# Patient Record
Sex: MALE | Race: BLACK OR AFRICAN AMERICAN | NOT HISPANIC OR LATINO | Employment: OTHER | URBAN - METROPOLITAN AREA
[De-identification: names, ages, dates, MRNs, and addresses within clinical notes are randomized per-mention and may not be internally consistent; named-entity substitution may affect disease eponyms.]

---

## 2023-07-28 DIAGNOSIS — E78.2 HYPERLIPIDEMIA, MIXED: ICD-10-CM

## 2023-07-28 DIAGNOSIS — I10 HYPERTENSION, UNSPECIFIED TYPE: Primary | ICD-10-CM

## 2023-07-28 RX ORDER — HYDROCHLOROTHIAZIDE 25 MG/1
25 TABLET ORAL DAILY
COMMUNITY
End: 2023-07-28 | Stop reason: SDUPTHER

## 2023-07-28 RX ORDER — ROSUVASTATIN CALCIUM 10 MG/1
10 TABLET, COATED ORAL DAILY
COMMUNITY
End: 2023-07-28 | Stop reason: SDUPTHER

## 2023-07-31 RX ORDER — HYDROCHLOROTHIAZIDE 25 MG/1
25 TABLET ORAL DAILY
Qty: 30 TABLET | Refills: 0 | Status: SHIPPED | OUTPATIENT
Start: 2023-07-31

## 2023-07-31 RX ORDER — ROSUVASTATIN CALCIUM 10 MG/1
10 TABLET, COATED ORAL DAILY
Qty: 30 TABLET | Refills: 0 | Status: SHIPPED | OUTPATIENT
Start: 2023-07-31

## 2023-08-28 ENCOUNTER — APPOINTMENT (OUTPATIENT)
Dept: PRIMARY CARE | Facility: CLINIC | Age: 53
End: 2023-08-28

## 2025-03-06 ENCOUNTER — APPOINTMENT (OUTPATIENT)
Dept: PRIMARY CARE | Facility: CLINIC | Age: 55
End: 2025-03-06
Payer: COMMERCIAL

## 2025-03-27 ENCOUNTER — APPOINTMENT (OUTPATIENT)
Dept: PRIMARY CARE | Facility: CLINIC | Age: 55
End: 2025-03-27
Payer: COMMERCIAL

## 2025-03-27 VITALS
TEMPERATURE: 98 F | DIASTOLIC BLOOD PRESSURE: 75 MMHG | SYSTOLIC BLOOD PRESSURE: 136 MMHG | HEART RATE: 94 BPM | OXYGEN SATURATION: 95 % | BODY MASS INDEX: 30.1 KG/M2 | WEIGHT: 215 LBS | HEIGHT: 71 IN

## 2025-03-27 DIAGNOSIS — Z11.4 SCREENING FOR HIV (HUMAN IMMUNODEFICIENCY VIRUS): ICD-10-CM

## 2025-03-27 DIAGNOSIS — Z00.00 HEALTHCARE MAINTENANCE: Primary | ICD-10-CM

## 2025-03-27 DIAGNOSIS — A60.01 HERPES SIMPLEX INFECTION OF PENIS: ICD-10-CM

## 2025-03-27 DIAGNOSIS — E78.2 HYPERLIPIDEMIA, MIXED: ICD-10-CM

## 2025-03-27 DIAGNOSIS — E55.9 HYPOVITAMINOSIS D: ICD-10-CM

## 2025-03-27 DIAGNOSIS — L20.84 INTRINSIC ECZEMA: ICD-10-CM

## 2025-03-27 DIAGNOSIS — Z12.11 COLON CANCER SCREENING: ICD-10-CM

## 2025-03-27 DIAGNOSIS — Z11.59 NEED FOR HEPATITIS C SCREENING TEST: ICD-10-CM

## 2025-03-27 DIAGNOSIS — I10 HYPERTENSION, UNSPECIFIED TYPE: ICD-10-CM

## 2025-03-27 DIAGNOSIS — Z83.3 FAMILY HISTORY OF DIABETES MELLITUS: ICD-10-CM

## 2025-03-27 DIAGNOSIS — Z12.5 PROSTATE CANCER SCREENING: ICD-10-CM

## 2025-03-27 RX ORDER — TRIAMCINOLONE ACETONIDE 1 MG/G
OINTMENT TOPICAL 2 TIMES DAILY
Qty: 453 G | Refills: 3 | Status: SHIPPED | OUTPATIENT
Start: 2025-03-27

## 2025-03-27 RX ORDER — ACETAMINOPHEN 500 MG
2000 TABLET ORAL
COMMUNITY
Start: 2024-10-08

## 2025-03-27 RX ORDER — ACETAMINOPHEN 500 MG
2000 TABLET ORAL
Qty: 90 CAPSULE | Refills: 3 | Status: CANCELLED | OUTPATIENT
Start: 2025-03-27

## 2025-03-27 RX ORDER — ATORVASTATIN CALCIUM 10 MG/1
TABLET, FILM COATED ORAL EVERY 24 HOURS
COMMUNITY

## 2025-03-27 RX ORDER — VALACYCLOVIR HYDROCHLORIDE 500 MG/1
500 TABLET, FILM COATED ORAL
COMMUNITY
Start: 2024-10-08 | End: 2025-03-27 | Stop reason: SDUPTHER

## 2025-03-27 RX ORDER — ATORVASTATIN CALCIUM 10 MG/1
10 TABLET, FILM COATED ORAL DAILY
Qty: 90 TABLET | Refills: 3 | Status: CANCELLED | OUTPATIENT
Start: 2025-03-27

## 2025-03-27 RX ORDER — ASPIRIN 81 MG/1
81 TABLET ORAL DAILY
Qty: 90 TABLET | Refills: 3 | Status: CANCELLED | OUTPATIENT
Start: 2025-03-27

## 2025-03-27 RX ORDER — TRIAMCINOLONE ACETONIDE 1 MG/G
OINTMENT TOPICAL
COMMUNITY
Start: 2021-09-15 | End: 2025-03-27 | Stop reason: SDUPTHER

## 2025-03-27 RX ORDER — ROSUVASTATIN CALCIUM 10 MG/1
10 TABLET, COATED ORAL DAILY
Qty: 90 TABLET | Refills: 3 | Status: SHIPPED | OUTPATIENT
Start: 2025-03-27

## 2025-03-27 RX ORDER — ASPIRIN 81 MG/1
1 TABLET ORAL DAILY
COMMUNITY
Start: 2021-03-07

## 2025-03-27 RX ORDER — HYDROCHLOROTHIAZIDE 25 MG/1
25 TABLET ORAL DAILY
Qty: 90 TABLET | Refills: 3 | Status: SHIPPED | OUTPATIENT
Start: 2025-03-27

## 2025-03-27 RX ORDER — VALACYCLOVIR HYDROCHLORIDE 500 MG/1
500 TABLET, FILM COATED ORAL
Qty: 30 TABLET | Refills: 11 | Status: SHIPPED | OUTPATIENT
Start: 2025-03-27 | End: 2026-03-27

## 2025-03-27 ASSESSMENT — ENCOUNTER SYMPTOMS
CHILLS: 0
AGITATION: 0
ACTIVITY CHANGE: 0
NAUSEA: 0
DIARRHEA: 0
SHORTNESS OF BREATH: 0
PALPITATIONS: 0
DEPRESSION: 0
SORE THROAT: 0
WEAKNESS: 0
SINUS PRESSURE: 0
MYALGIAS: 0
CHEST TIGHTNESS: 0

## 2025-03-27 ASSESSMENT — PATIENT HEALTH QUESTIONNAIRE - PHQ9
2. FEELING DOWN, DEPRESSED OR HOPELESS: NOT AT ALL
SUM OF ALL RESPONSES TO PHQ9 QUESTIONS 1 AND 2: 0
1. LITTLE INTEREST OR PLEASURE IN DOING THINGS: NOT AT ALL

## 2025-03-27 ASSESSMENT — PAIN SCALES - GENERAL: PAINLEVEL_OUTOF10: 0-NO PAIN

## 2025-03-27 NOTE — ASSESSMENT & PLAN NOTE
He has got good blood pressure control.  Orders:    hydroCHLOROthiazide (HYDRODiuril) 25 mg tablet; Take 1 tablet (25 mg) by mouth once daily.

## 2025-03-27 NOTE — ASSESSMENT & PLAN NOTE
We will continue with chronic suppressive therapy.  Orders:    valACYclovir (Valtrex) 500 mg tablet; Take 1 tablet (500 mg) by mouth once daily.

## 2025-03-27 NOTE — ASSESSMENT & PLAN NOTE
We talked about the increased risk associated with chronically using steroids.  Orders:    triamcinolone (Kenalog) 0.1 % ointment; Apply topically 2 times a day.

## 2025-03-27 NOTE — ASSESSMENT & PLAN NOTE
Will measure as well.   Orders:    rosuvastatin (Crestor) 10 mg tablet; Take 1 tablet (10 mg) by mouth once daily.    CT cardiac scoring wo IV contrast; Future    Lipoprotein a; Future    Lipid Panel; Future

## 2025-03-27 NOTE — PROGRESS NOTES
"Subjective   Patient ID: Frankie Florez is a 55 y.o. male who presents for Annual Exam.   He has inconsistent adherence with meds. Recently .  He put his wife on the phone so we could address the concerns she had.  She also reports that he had poor adherence to medications in general.  He feels pretty well overall and has no complaints other than having chronic eczema that is helped with the cream he has listed.    HPI     Review of Systems   Constitutional:  Negative for activity change and chills.   HENT:  Negative for congestion, sinus pressure and sore throat.    Respiratory:  Negative for chest tightness and shortness of breath.    Cardiovascular:  Negative for chest pain and palpitations.   Gastrointestinal:  Negative for diarrhea and nausea.   Musculoskeletal:  Negative for myalgias.   Skin:         History of eczema   Neurological:  Negative for weakness.   Psychiatric/Behavioral:  Negative for agitation and behavioral problems.        Objective   /75 (BP Location: Left arm, Patient Position: Sitting, BP Cuff Size: Large adult)   Pulse 94   Temp 36.7 °C (98 °F) (Temporal)   Ht 1.803 m (5' 11\")   Wt 97.5 kg (215 lb)   SpO2 95%   BMI 29.99 kg/m²     Physical Exam  Constitutional:       Appearance: Normal appearance.   HENT:      Head: Normocephalic and atraumatic.      Nose: Nose normal.      Mouth/Throat:      Mouth: Mucous membranes are moist.   Eyes:      Extraocular Movements: Extraocular movements intact.   Cardiovascular:      Rate and Rhythm: Normal rate and regular rhythm.   Pulmonary:      Effort: No respiratory distress.      Breath sounds: No wheezing.   Abdominal:      General: Bowel sounds are normal.      Palpations: Abdomen is soft.   Neurological:      General: No focal deficit present.       Assessment/Plan   Assessment & Plan  Healthcare maintenance  Doing fairly well overall.       Hyperlipidemia, mixed  Will measure as well.   Orders:    rosuvastatin (Crestor) 10 mg tablet; " Take 1 tablet (10 mg) by mouth once daily.    CT cardiac scoring wo IV contrast; Future    Lipoprotein a; Future    Lipid Panel; Future    Hypertension, unspecified type  He has got good blood pressure control.  Orders:    hydroCHLOROthiazide (HYDRODiuril) 25 mg tablet; Take 1 tablet (25 mg) by mouth once daily.    Hypovitaminosis D  We'll measure his levels.       Intrinsic eczema  We talked about the increased risk associated with chronically using steroids.  Orders:    triamcinolone (Kenalog) 0.1 % ointment; Apply topically 2 times a day.    Herpes simplex infection of penis  We will continue with chronic suppressive therapy.  Orders:    valACYclovir (Valtrex) 500 mg tablet; Take 1 tablet (500 mg) by mouth once daily.    Screening for HIV (human immunodeficiency virus)    Orders:    HIV 1/2 Antigen/Antibody Screen with Reflex to Confirmation; Future    Need for hepatitis C screening test    Orders:    Hepatitis C Antibody; Future    Family history of diabetes mellitus    Orders:    Hemoglobin A1C; Future    Colon cancer screening  He agrees to Cologuard screening.   Orders:    Cologuard® colon cancer screening; Future    Prostate cancer screening  We will screen for prostate cancer as well.  Orders:    Prostate Specific Antigen; Future

## 2025-03-28 ASSESSMENT — ENCOUNTER SYMPTOMS: ROS SKIN COMMENTS: HISTORY OF ECZEMA

## 2025-04-06 LAB
CHOLEST SERPL-MCNC: 162 MG/DL
CHOLEST/HDLC SERPL: 3.9 (CALC)
EST. AVERAGE GLUCOSE BLD GHB EST-MCNC: 105 MG/DL
EST. AVERAGE GLUCOSE BLD GHB EST-SCNC: 5.8 MMOL/L
HBA1C MFR BLD: 5.3 % OF TOTAL HGB
HCV AB SERPL QL IA: NORMAL
HDLC SERPL-MCNC: 42 MG/DL
HIV 1+2 AB+HIV1 P24 AG SERPL QL IA: NORMAL
LDLC SERPL CALC-MCNC: 95 MG/DL (CALC)
LPA SERPL-SCNC: NORMAL NMOL/L
NONHDLC SERPL-MCNC: 120 MG/DL (CALC)
PSA SERPL-MCNC: 2.07 NG/ML
TRIGL SERPL-MCNC: 152 MG/DL

## 2025-04-07 ENCOUNTER — TELEPHONE (OUTPATIENT)
Dept: PRIMARY CARE | Facility: HOSPITAL | Age: 55
End: 2025-04-07
Payer: COMMERCIAL

## 2025-04-07 NOTE — TELEPHONE ENCOUNTER
Result Communication    Resulted Orders   Lipoprotein a   Result Value Ref Range    LIPOPROTEIN (a)      Narrative    FASTING:YES    FASTING: YES   HIV 1/2 Antigen/Antibody Screen with Reflex to Confirmation   Result Value Ref Range    HIV AG/AB, 4TH GEN NON-REACTIVE NON-REACTIVE      Comment:      HIV-1 antigen and HIV-1/HIV-2 antibodies were not  detected. There is no laboratory evidence of HIV  infection.     PLEASE NOTE: This information has been disclosed to  you from records whose confidentiality may be  protected by state law.  If your state requires such  protection, then the state law prohibits you from  making any further disclosure of the information  without the specific written consent of the person  to whom it pertains, or as otherwise permitted by law.  A general authorization for the release of medical or  other information is NOT sufficient for this purpose.      For additional information please refer to  http://[employer]/faq/SHI603  (This link is being provided for informational/  educational purposes only.)        The performance of this assay has not been clinically  validated in patients less than 2 years old.         Narrative    FASTING:YES    FASTING: YES   Hepatitis C Antibody   Result Value Ref Range    HEPATITIS C ANTIBODY NON-REACTIVE NON-REACTIVE      Comment:         HCV antibody was non-reactive. There is no laboratory   evidence of HCV infection.     In most cases, no further action is required. However,  if recent HCV exposure is suspected, a test for HCV RNA  (test code 18323) is suggested.     For additional information please refer to  http://education.[employer]/faq/YJD68k5  (This link is being provided for informational/  educational purposes only.)         Narrative    FASTING:YES    FASTING: YES   Hemoglobin A1C   Result Value Ref Range    HEMOGLOBIN A1c 5.3 <5.7 % of total Hgb      Comment:      For the purpose of screening for the presence  of  diabetes:     <5.7%       Consistent with the absence of diabetes  5.7-6.4%    Consistent with increased risk for diabetes              (prediabetes)  > or =6.5%  Consistent with diabetes     This assay result is consistent with a decreased risk  of diabetes.     Currently, no consensus exists regarding use of  hemoglobin A1c for diagnosis of diabetes in children.     According to American Diabetes Association (ADA)  guidelines, hemoglobin A1c <7.0% represents optimal  control in non-pregnant diabetic patients. Different  metrics may apply to specific patient populations.   Standards of Medical Care in Diabetes(ADA).          eAG (mg/dL) 105 mg/dL    eAG (mmol/L) 5.8 mmol/L    Narrative    FASTING:YES    FASTING: YES   Prostate Specific Antigen   Result Value Ref Range    PSA, TOTAL 2.07 < OR = 4.00 ng/mL      Comment:      The total PSA value from this assay system is   standardized against the WHO standard. The test   result will be approximately 20% lower when compared   to the equimolar-standardized total PSA (James   Robbie). Comparison of serial PSA results should be   interpreted with this fact in mind.     This test was performed using the Siemens   chemiluminescent method. Values obtained from   different assay methods cannot be used  interchangeably. PSA levels, regardless of  value, should not be interpreted as absolute  evidence of the presence or absence of disease.      Narrative    FASTING:YES    FASTING: YES   Lipid Panel   Result Value Ref Range    CHOLESTEROL, TOTAL 162 <200 mg/dL    HDL CHOLESTEROL 42 > OR = 40 mg/dL    TRIGLYCERIDES 152 (H) <150 mg/dL    LDL-CHOLESTEROL 95 mg/dL (calc)      Comment:      Reference range: <100     Desirable range <100 mg/dL for primary prevention;    <70 mg/dL for patients with CHD or diabetic patients   with > or = 2 CHD risk factors.     LDL-C is now calculated using the Robbin-Gutierrez   calculation, which is a validated novel method providing   better  accuracy than the Friedewald equation in the   estimation of LDL-C.   Robbin SS et al. LUIS. 2013;310(19): 2082-0298   (http://education.[employer]/faq/QLO772)      CHOL/HDLC RATIO 3.9 <5.0 (calc)    NON HDL CHOLESTEROL 120 <130 mg/dL (calc)      Comment:      For patients with diabetes plus 1 major ASCVD risk   factor, treating to a non-HDL-C goal of <100 mg/dL   (LDL-C of <70 mg/dL) is considered a therapeutic   option.      Narrative    FASTING:YES    FASTING: YES       9:50 AM      Results he was again seen Dr. Corea I do not bitwere successfully communicated with the good good good just I will let you know the labs all came back good yeah so everything turned out just call me take care of christian and they acknowledged their understanding.

## 2025-04-09 LAB
CHOLEST SERPL-MCNC: 162 MG/DL
CHOLEST/HDLC SERPL: 3.9 (CALC)
EST. AVERAGE GLUCOSE BLD GHB EST-MCNC: 105 MG/DL
EST. AVERAGE GLUCOSE BLD GHB EST-SCNC: 5.8 MMOL/L
HBA1C MFR BLD: 5.3 % OF TOTAL HGB
HCV AB SERPL QL IA: NORMAL
HDLC SERPL-MCNC: 42 MG/DL
HIV 1+2 AB+HIV1 P24 AG SERPL QL IA: NORMAL
LDLC SERPL CALC-MCNC: 95 MG/DL (CALC)
LPA SERPL-SCNC: 66 NMOL/L
NONHDLC SERPL-MCNC: 120 MG/DL (CALC)
PSA SERPL-MCNC: 2.07 NG/ML
TRIGL SERPL-MCNC: 152 MG/DL

## 2025-04-11 LAB — NONINV COLON CA DNA+OCC BLD SCRN STL QL: NEGATIVE
